# Patient Record
Sex: FEMALE | URBAN - METROPOLITAN AREA
[De-identification: names, ages, dates, MRNs, and addresses within clinical notes are randomized per-mention and may not be internally consistent; named-entity substitution may affect disease eponyms.]

---

## 2017-10-17 NOTE — PROCEDURE NOTE: CLINICAL
PROCEDURE NOTE: Avastin () #7 OS. Diagnosis: Neovascular AMD with Inactive CNV. Prior to the original injection, risks/benefits/alternatives discussed including infection, loss of vision, hemorrhage, cataract, glaucoma, retinal tears or detachment. A written consent is on file, and the need for today’s injection was discussed and the patient is understanding and wishes to proceed. The off-label status of Intravitreal Avastin also was reviewed. The patient wished to proceed with treatment. The patient wished to proceed with treatment. Topical anesthetic drops were applied to the eye. Betadine prep was performed. Surgical mask worn. Sterile drape and lid speculum were applied. Using the syringe provided, Avastin 1.25 mg in 0.05 cc was injected into the vitreous cavity. Injection site: 3-4 mm from the limbus. Patient tolerated procedure well. Following the intravitreal injection, the sterile lid speculum was removed. CRA perfusion confirmed. CF vision checked. Patient given office phone number/answering service number and advised to call immediately should there be an increase in floaters or redness, loss of vision or pain, or should they have any other questions or concerns. Ashley Parker

## 2017-10-17 NOTE — PATIENT DISCUSSION
Based on today’s exam, diagnostic studies, and/or review of records, the determination was made for treatment today Avastin #7.

## 2018-01-23 NOTE — PROCEDURE NOTE: CLINICAL
PROCEDURE NOTE: Eylea #7 OS. Diagnosis: Neovascular AMD with Inactive CNV. Prep: Betadine Drops and Betadine Scrub. Prior to injection, risks/benefits/alternatives discussed including corneal abrasion, infection, loss of vision, hemorrhage, cataract, glaucoma, retinal tears or detachment. A written consent is on file, and the need for today’s injection was discussed and the patient is understanding and wishes to proceed. The entire vial of Eylea was drawn up into a syringe. The opened vial, remaining drug, and filtered needle were disposed of in a certified biohazard container. Betadine prep was performed. Topical anesthesia was induced with Alcaine. 4% lidocaine pledge. A lid speculum was used. A short 30g needle on a 1cc syringe was used with product that that had previously been prepared under sterile conditions. Injection site: 3-4 mm from the limbus. The used syringe/needle was transferred to a biohazard container. Lid speculum removed. Mask worn during procedure. Patient tolerated procedure well. Count fingers vision was verified. There were no complications. Patient was given the standard instruction sheet. Patient given office phone number/answering service number and advised to call immediately should there be loss of vision or pain, or should they have any other questions or concerns. Danny Hdz

## 2018-01-23 NOTE — PATIENT DISCUSSION
Recommended eylea #7 injection. The injection was given and tolerated well by patient. Post-injection instructions were reviewed and understood by the patient.

## 2018-02-20 NOTE — PROCEDURE NOTE: CLINICAL
PROCEDURE NOTE: Eylea #8 OS. Diagnosis: Neovascular AMD with Inactive CNV. Prior to injection, risks/benefits/alternatives discussed including corneal abrasion, infection, loss of vision, hemorrhage, cataract, glaucoma, retinal tears or detachment. A written consent is on file, and the need for today’s injection was discussed and the patient is understanding and wishes to proceed. The entire vial of Eylea was drawn up into a syringe. The opened vial, remaining drug, and filtered needle were disposed of in a certified biohazard container. Betadine prep was performed. Topical anesthesia was induced with Alcaine. 4% lidocaine pledge. A lid speculum was used. A short 30g needle on a 1cc syringe was used with product that that had previously been prepared under sterile conditions. Injection site: 3-4 mm from the limbus. The used syringe/needle was transferred to a biohazard container. Lid speculum removed. Mask worn during procedure. Patient tolerated procedure well. Count fingers vision was verified. There were no complications. Patient was given the standard instruction sheet. Patient given office phone number/answering service number and advised to call immediately should there be loss of vision or pain, or should they have any other questions or concerns. Cynthia Kumar

## 2018-03-20 NOTE — PATIENT DISCUSSION
Recommended Eylea #9 injection. The injection was given and tolerated well by patient. Post-injection instructions were reviewed and understood by the patient.

## 2018-03-20 NOTE — PROCEDURE NOTE: CLINICAL
PROCEDURE NOTE: Eylea #9 OS. Diagnosis: Neovascular AMD with Inactive CNV. Prep: Betadine Drops and Betadine Scrub. Prior to injection, risks/benefits/alternatives discussed including corneal abrasion, infection, loss of vision, hemorrhage, cataract, glaucoma, retinal tears or detachment. A written consent is on file, and the need for today’s injection was discussed and the patient is understanding and wishes to proceed. The entire vial of Eylea was drawn up into a syringe. The opened vial, remaining drug, and filtered needle were disposed of in a certified biohazard container. Betadine prep was performed. Topical anesthesia was induced with Alcaine. 4% lidocaine pledge. A lid speculum was used. A short 30g needle on a 1cc syringe was used with product that that had previously been prepared under sterile conditions. Injection site: 3-4 mm from the limbus. The used syringe/needle was transferred to a biohazard container. Lid speculum removed. Mask worn during procedure. Patient tolerated procedure well. Count fingers vision was verified. There were no complications. Patient was given the standard instruction sheet. Patient given office phone number/answering service number and advised to call immediately should there be loss of vision or pain, or should they have any other questions or concerns. Filippo Mcintosh

## 2018-05-01 NOTE — PROCEDURE NOTE: CLINICAL
PROCEDURE NOTE: Eylea #10 OS. Diagnosis: Neovascular AMD with Inactive CNV. Prep: Betadine Drops and Betadine Scrub. Prior to injection, risks/benefits/alternatives discussed including corneal abrasion, infection, loss of vision, hemorrhage, cataract, glaucoma, retinal tears or detachment. A written consent is on file, and the need for today’s injection was discussed and the patient is understanding and wishes to proceed. The entire vial of Eylea was drawn up into a syringe. The opened vial, remaining drug, and filtered needle were disposed of in a certified biohazard container. Betadine prep was performed. Topical anesthesia was induced with Alcaine. 4% lidocaine pledge. A lid speculum was used. A short 30g needle on a 1cc syringe was used with product that that had previously been prepared under sterile conditions. Injection site: 3-4 mm from the limbus. The used syringe/needle was transferred to a biohazard container. Lid speculum removed. Mask worn during procedure. Patient tolerated procedure well. Count fingers vision was verified. There were no complications. Patient was given the standard instruction sheet. Patient given office phone number/answering service number and advised to call immediately should there be loss of vision or pain, or should they have any other questions or concerns. Leia Amaro

## 2018-05-01 NOTE — PATIENT DISCUSSION
Recommended Eylea #10 injection. The injection was given and tolerated well by patient. Post-injection instructions were reviewed and understood by the patient.

## 2018-06-26 NOTE — PATIENT DISCUSSION
Decision to treat was made based on today's clinical findings.  Increased IRF. Recommend continuing with treatment today.  Continue  with injections q 8 weeks given monocular status.

## 2018-06-26 NOTE — PATIENT DISCUSSION
Recommended Eylea #11 injection. The injection was given and tolerated well by patient. Post-injection instructions were reviewed and understood by the patient.

## 2018-06-26 NOTE — PROCEDURE NOTE: CLINICAL
PROCEDURE NOTE: Eylea #11 OS. Diagnosis: Neovascular AMD with Inactive CNV. Prior to injection, risks/benefits/alternatives discussed including corneal abrasion, infection, loss of vision, hemorrhage, cataract, glaucoma, retinal tears or detachment. A written consent is on file, and the need for today’s injection was discussed and the patient is understanding and wishes to proceed. The entire vial of Eylea was drawn up into a syringe. The opened vial, remaining drug, and filtered needle were disposed of in a certified biohazard container. Betadine prep was performed. Topical anesthesia was induced with Alcaine. 4% lidocaine pledge. A lid speculum was used. A short 30g needle on a 1cc syringe was used with product that that had previously been prepared under sterile conditions. Injection site: 3-4 mm from the limbus. The used syringe/needle was transferred to a biohazard container. Lid speculum removed. Mask worn during procedure. Patient tolerated procedure well. Count fingers vision was verified. There were no complications. Patient was given the standard instruction sheet. Patient given office phone number/answering service number and advised to call immediately should there be loss of vision or pain, or should they have any other questions or concerns. Carlos Goddard

## 2018-08-16 NOTE — PATIENT DISCUSSION
Pt is frustrated with all tx and wants to disc with Banker of whether she needs to cont the injections so often. I told her that I feel her VA is pretty significant loss but Low vision is an option.

## 2018-08-21 NOTE — PATIENT DISCUSSION
Eylea injection recommended today after discussion of benefits, risks, alternatives. The injection was administered without complication. Post-injection instructions were reviewed and understood by the patient.

## 2018-08-21 NOTE — PROCEDURE NOTE: CLINICAL
PROCEDURE NOTE: Eylea OS. Diagnosis: Neovascular AMD with Active CNV. Prior to injection, risks/benefits/alternatives discussed including corneal abrasion, infection, loss of vision, hemorrhage, cataract, glaucoma, retinal tears or detachment. A written consent is on file, and the need for today’s injection was discussed and the patient is understanding and wishes to proceed. The entire vial of Eylea was drawn up into a syringe. The opened vial, remaining drug, and filtered needle were disposed of in a certified biohazard container. Betadine prep was performed. Topical anesthesia was induced with Alcaine. 4% lidocaine pledge. A lid speculum was used. A short 30g needle on a 1cc syringe was used with product that that had previously been prepared under sterile conditions. Injection site: 3-4 mm from the limbus. The used syringe/needle was transferred to a biohazard container. Lid speculum removed. Mask worn during procedure. Patient tolerated procedure well. Count fingers vision was verified. There were no complications. Patient was given the standard instruction sheet. Patient given office phone number/answering service number and advised to call immediately should there be loss of vision or pain, or should they have any other questions or concerns. Filippo Mcintosh

## 2020-08-17 ENCOUNTER — IMPORTED ENCOUNTER (OUTPATIENT)
Dept: URBAN - METROPOLITAN AREA CLINIC 9 | Facility: CLINIC | Age: 60
End: 2020-08-17

## 2021-10-16 ASSESSMENT — TONOMETRY
OD_IOP_MMHG: 10
OS_IOP_MMHG: 10

## 2021-10-16 ASSESSMENT — KERATOMETRY
OD_AXISANGLE_DEGREES: 165
OD_K1POWER_DIOPTERS: 46.5
OD_AXISANGLE2_DEGREES: 75
OD_K2POWER_DIOPTERS: 47
OS_K2POWER_DIOPTERS: 46.5
OS_K1POWER_DIOPTERS: 46.25
OS_AXISANGLE_DEGREES: 144
OS_AXISANGLE2_DEGREES: 54

## 2021-10-16 ASSESSMENT — VISUAL ACUITY
OD_CC: 20/20 SN
OS_CC: 20/25 SN
OS_CC: 20/20 - SN
OD_SC: 20/20 - SN
OS_SC: 20/20 -2 SN